# Patient Record
Sex: FEMALE | Race: WHITE | HISPANIC OR LATINO | Employment: FULL TIME | URBAN - METROPOLITAN AREA
[De-identification: names, ages, dates, MRNs, and addresses within clinical notes are randomized per-mention and may not be internally consistent; named-entity substitution may affect disease eponyms.]

---

## 2017-11-04 ENCOUNTER — HOSPITAL ENCOUNTER (EMERGENCY)
Facility: HOSPITAL | Age: 37
Discharge: HOME | End: 2017-11-05
Attending: EMERGENCY MEDICINE
Payer: COMMERCIAL

## 2017-11-04 VITALS
WEIGHT: 110 LBS | HEIGHT: 60 IN | DIASTOLIC BLOOD PRESSURE: 86 MMHG | HEART RATE: 83 BPM | SYSTOLIC BLOOD PRESSURE: 133 MMHG | RESPIRATION RATE: 18 BRPM | TEMPERATURE: 98 F | BODY MASS INDEX: 21.6 KG/M2 | OXYGEN SATURATION: 100 %

## 2017-11-04 DIAGNOSIS — L30.9 DERMATITIS: Primary | ICD-10-CM

## 2017-11-04 PROCEDURE — 99283 EMERGENCY DEPT VISIT LOW MDM: CPT

## 2017-11-04 RX ORDER — HYDROXYZINE PAMOATE 25 MG/1
25 CAPSULE ORAL 4 TIMES DAILY
Qty: 28 CAPSULE | Refills: 0 | Status: SHIPPED | OUTPATIENT
Start: 2017-11-04

## 2017-11-05 NOTE — DISCHARGE INSTRUCTIONS
Switched to a hypoallergenic soap, and refresh loofah.  Take Allegra over-the-counter daily.  Follow-up with ALLERGY as referred.

## 2017-11-05 NOTE — ED PROVIDER NOTES
Encounter Date: 11/4/2017       History     Chief Complaint   Patient presents with    Rash     to upper back, arms, and feet; pt states that she has been very itchy for the last week     Chief complaint: Rash    History of present illness: Patient is a 37 red female presents for one week of itchy skin that began on the feet but is now located behind her legs and also on her back and arms.  Reports itching is constant.  She did take Claritin which alleviated the problem as did rubbing the affected areas with alcohol and ice.  She reports this is not painful.  Denies fever, chills.  Has never had this problem previously.      The history is provided by the patient. No  was used.     Review of patient's allergies indicates:  No Known Allergies  History reviewed. No pertinent past medical history.  History reviewed. No pertinent surgical history.  History reviewed. No pertinent family history.  Social History   Substance Use Topics    Smoking status: Never Smoker    Smokeless tobacco: Never Used    Alcohol use Yes     Review of Systems   Constitutional: Negative for chills, fatigue and fever.   HENT: Negative for congestion, ear discharge, ear pain, postnasal drip, rhinorrhea, sinus pressure, sneezing, sore throat and voice change.    Eyes: Negative for discharge and itching.   Respiratory: Negative for cough, shortness of breath and wheezing.    Cardiovascular: Negative for chest pain, palpitations and leg swelling.   Gastrointestinal: Negative for abdominal pain, constipation, diarrhea, nausea and vomiting.   Endocrine: Negative for polydipsia, polyphagia and polyuria.   Genitourinary: Negative for dysuria, frequency, hematuria, urgency, vaginal bleeding, vaginal discharge and vaginal pain.   Musculoskeletal: Negative for arthralgias and myalgias.   Skin: Positive for rash. Negative for wound.   Neurological: Negative for dizziness, seizures, syncope, weakness and numbness.   Hematological:  Negative for adenopathy. Does not bruise/bleed easily.   Psychiatric/Behavioral: Negative for self-injury and suicidal ideas. The patient is not nervous/anxious.        Physical Exam     Initial Vitals [11/04/17 2145]   BP Pulse Resp Temp SpO2   133/86 83 18 98.1 °F (36.7 °C) 100 %      MAP       101.67         Physical Exam    Nursing note and vitals reviewed.  Constitutional: She appears well-developed and well-nourished.   HENT:   Head: Normocephalic and atraumatic.   Right Ear: External ear normal.   Left Ear: External ear normal.   Nose: Nose normal.   Eyes: Conjunctivae and EOM are normal. Pupils are equal, round, and reactive to light. Right eye exhibits no discharge. Left eye exhibits no discharge.   Neck: Normal range of motion.   Abdominal: She exhibits no distension.   Musculoskeletal: Normal range of motion.   Neurological: She is alert and oriented to person, place, and time.   Skin: Skin is dry. Capillary refill takes less than 2 seconds. No rash noted.         ED Course   Procedures  Labs Reviewed - No data to display          Medical Decision Making:   Initial Assessment:   37-year-old female with itchy skin for one week relieved by antihistamines, alcohol rub, ice  Differential Diagnosis:   Differential diagnosis includes but is not limited to ALLERGIC reaction, dry skin dermatitis, reaction to chemical exposure.  ED Management:  Plan: Vistaril 25 mg by mouth 4 times a day when necessary itching, Allegra over-the-counter as directed on package, follow up with dermatologist or ALLERGY clinic  Plan discussed with Dr. Hunter who concurs                   ED Course as of Nov 05 0250   Sat Nov 04, 2017   2310 BP: 133/86 [VC]   2310 Temp: 98.1 °F (36.7 °C) [VC]   2310 Pulse: 83 [VC]   2310 Resp: 18 [VC]   2310 Vs normal, triage note reviewed. SpO2: 100 % [VC]      ED Course User Index  [VC] Kennedy Inman DNP     Clinical Impression:   The encounter diagnosis was Dermatitis.    Disposition:    Disposition: Discharged  Condition: Stable                        Kennedy Inman, JEISON  11/05/17 0252